# Patient Record
Sex: FEMALE | Race: WHITE | NOT HISPANIC OR LATINO | Employment: FULL TIME | URBAN - METROPOLITAN AREA
[De-identification: names, ages, dates, MRNs, and addresses within clinical notes are randomized per-mention and may not be internally consistent; named-entity substitution may affect disease eponyms.]

---

## 2019-04-18 ENCOUNTER — APPOINTMENT (OUTPATIENT)
Dept: RADIOLOGY | Facility: MEDICAL CENTER | Age: 69
End: 2019-04-18
Attending: EMERGENCY MEDICINE
Payer: OTHER MISCELLANEOUS

## 2019-04-18 ENCOUNTER — HOSPITAL ENCOUNTER (EMERGENCY)
Facility: MEDICAL CENTER | Age: 69
End: 2019-04-18
Attending: EMERGENCY MEDICINE
Payer: OTHER MISCELLANEOUS

## 2019-04-18 VITALS
HEIGHT: 63 IN | SYSTOLIC BLOOD PRESSURE: 133 MMHG | BODY MASS INDEX: 34.38 KG/M2 | OXYGEN SATURATION: 98 % | DIASTOLIC BLOOD PRESSURE: 75 MMHG | RESPIRATION RATE: 18 BRPM | TEMPERATURE: 98.5 F | WEIGHT: 194 LBS | HEART RATE: 78 BPM

## 2019-04-18 DIAGNOSIS — S29.019A STRAIN OF THORACIC REGION, INITIAL ENCOUNTER: ICD-10-CM
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
## 2019-04-18 DIAGNOSIS — S00.03XA HEMATOMA OF SCALP, INITIAL ENCOUNTER: ICD-10-CM

## 2019-04-18 DIAGNOSIS — S32.591A PUBIC RAMUS FRACTURE, RIGHT, CLOSED, INITIAL ENCOUNTER (HCC): ICD-10-CM

## 2019-04-18 DIAGNOSIS — S20.219A CONTUSION OF CHEST WALL, UNSPECIFIED LATERALITY, INITIAL ENCOUNTER: ICD-10-CM

## 2019-04-18 PROCEDURE — A9270 NON-COVERED ITEM OR SERVICE: HCPCS | Performed by: EMERGENCY MEDICINE

## 2019-04-18 PROCEDURE — 72128 CT CHEST SPINE W/O DYE: CPT

## 2019-04-18 PROCEDURE — 700102 HCHG RX REV CODE 250 W/ 637 OVERRIDE(OP): Performed by: EMERGENCY MEDICINE

## 2019-04-18 PROCEDURE — 71045 X-RAY EXAM CHEST 1 VIEW: CPT

## 2019-04-18 PROCEDURE — 72125 CT NECK SPINE W/O DYE: CPT

## 2019-04-18 PROCEDURE — 70450 CT HEAD/BRAIN W/O DYE: CPT

## 2019-04-18 PROCEDURE — 700111 HCHG RX REV CODE 636 W/ 250 OVERRIDE (IP): Performed by: EMERGENCY MEDICINE

## 2019-04-18 PROCEDURE — 99285 EMERGENCY DEPT VISIT HI MDM: CPT

## 2019-04-18 PROCEDURE — 72192 CT PELVIS W/O DYE: CPT

## 2019-04-18 PROCEDURE — 96374 THER/PROPH/DIAG INJ IV PUSH: CPT

## 2019-04-18 PROCEDURE — 73502 X-RAY EXAM HIP UNI 2-3 VIEWS: CPT | Mod: RT

## 2019-04-18 RX ORDER — IBUPROFEN 600 MG/1
600 TABLET ORAL ONCE
Status: COMPLETED | OUTPATIENT
Start: 2019-04-18 | End: 2019-04-18

## 2019-04-18 RX ORDER — METOPROLOL TARTRATE 50 MG/1
50 TABLET, FILM COATED ORAL 2 TIMES DAILY
COMMUNITY

## 2019-04-18 RX ORDER — HYDROCHLOROTHIAZIDE 25 MG/1
25 TABLET ORAL DAILY
COMMUNITY

## 2019-04-18 RX ORDER — KETOROLAC TROMETHAMINE 30 MG/ML
15 INJECTION, SOLUTION INTRAMUSCULAR; INTRAVENOUS ONCE
Status: COMPLETED | OUTPATIENT
Start: 2019-04-18 | End: 2019-04-18

## 2019-04-18 RX ORDER — METHOCARBAMOL 750 MG/1
750 TABLET, FILM COATED ORAL 4 TIMES DAILY
Qty: 12 TAB | Refills: 0 | Status: SHIPPED | OUTPATIENT
Start: 2019-04-18 | End: 2019-04-21

## 2019-04-18 RX ORDER — HYDROCODONE BITARTRATE AND ACETAMINOPHEN 5; 325 MG/1; MG/1
1 TABLET ORAL ONCE
Status: COMPLETED | OUTPATIENT
Start: 2019-04-18 | End: 2019-04-18

## 2019-04-18 RX ORDER — LISINOPRIL 20 MG/1
20 TABLET ORAL DAILY
COMMUNITY

## 2019-04-18 RX ORDER — LEVOTHYROXINE SODIUM 112 UG/1
112 TABLET ORAL
COMMUNITY

## 2019-04-18 RX ORDER — KETOROLAC TROMETHAMINE 30 MG/ML
15 INJECTION, SOLUTION INTRAMUSCULAR; INTRAVENOUS ONCE
Status: DISCONTINUED | OUTPATIENT
Start: 2019-04-18 | End: 2019-04-18

## 2019-04-18 RX ADMIN — KETOROLAC TROMETHAMINE 15 MG: 30 INJECTION, SOLUTION INTRAMUSCULAR at 19:00

## 2019-04-18 RX ADMIN — HYDROCODONE BITARTRATE AND ACETAMINOPHEN 1 TABLET: 5; 325 TABLET ORAL at 20:24

## 2019-04-18 RX ADMIN — IBUPROFEN 600 MG: 600 TABLET ORAL at 17:02

## 2019-04-18 NOTE — ED TRIAGE NOTES
"Chief Complaint   Patient presents with   • Head Injury     hit head on bumper, swelling to top of scalp.    • Chest Injury     Pt hit in the chest by a semi truck barone and fell back into another semi striking her head on the bumper.   • Hip Injury     R hip pain after fall, pt is able to ambulate     /86   Pulse 84   Temp 36.7 °C (98.1 °F) (Temporal)   Resp 20   Ht 1.6 m (5' 3\")   Wt 88 kg (194 lb 0.1 oz)   SpO2 92%   BMI 34.37 kg/m²     "

## 2019-04-18 NOTE — LETTER
"  FORM C-4:  EMPLOYEE’S CLAIM FOR COMPENSATION/ REPORT OF INITIAL TREATMENT  EMPLOYEE’S CLAIM - PROVIDE ALL INFORMATION REQUESTED   First Name  Delaney Last Name  Riley Birthdate             Age  1950 68 y.o. Sex  female Claim Number   Home Employee Address  [address] [place]                                     Zip  [address] Height  1.6 m (5' 3\") Weight  88 kg (194 lb 0.1 oz) [facility]     Mailing Employee Address                           [address] [place]               Zip  [address] Telephone  [phone] (home)  Primary Language Spoken  ENGLISH   Insurer Third Party    Employee's Occupation (Job Title) When Injury or Occupational Disease Occurred     Employer's Name   [employer] Telephone  [phone]    Employer Address  [address] [address]] Zip  [address]   Date of Injury        4/18/19 Hour of Injury  13:15 Date Employer Notified  4/18/19 Last Day of Work after Injury or Occupational Disease  4/18/19 Supervisor to Whom Injury Reported  Az Sandhu   Address or Location of Accident (if applicable)  [[employer]  [facility]]   What were you doing at the time of accident? (if applicable)   Lifting barone of turck to check oil   How did this injury or occupational disease occur? Be specific and answer in detail. Use additional sheet if necessary)  Lifted barone on trk wasn't secured properly came open too far hit me knocked me to ground, hit my head on ice bumper on truck parked in front of me   If you believe that you have an occupational disease, when did you first have knowledge of the disability and it relationship to your employment?  n/a Witnesses to the Accident    n/a   Nature of Injury or Occupational Disease   n/a Part(s) of Body Injured or Affected  , , head, back chest & pelvis fracture   I certify that the above is true and correct to the best of my knowledge and that I " have provided this information in order to obtain the benefits of [place] Chapter 617 [place]  I hereby authorize any physician, chiropractor, surgeon, practitioner, or other person, any hospital, including [facility] or [facility] hospital, any medical service organization, any insurance company, or other institution or organization to release to each other, any medical or other information, including benefits paid or payable, pertinent to this injury or disease, except information relative to diagnosis, treatment and/or counseling for AIDS, psychological conditions, alcohol or controlled substances, for which I must give specific authorization.  A Photostat of this authorization shall be as valid as the original.   Date  April 18, 2019 Place  [facility] Employee’s Signature   THIS REPORT MUST BE COMPLETED AND MAILED WITHIN 3 WORKING DAYS OF TREATMENT   Place  [facility], EMERGENCY DEPT  Name of Facility   [facility]   Date  4/18/2019 Diagnosis  (W19.XXXA) Fall, initial encounter, Acute  (S00.03XA) Hematoma of scalp, initial encounter, Acute  (S32.591A) Pubic ramus fracture, right, closed, initial encounter (HCC), Acute  (S20.219A) Contusion of chest wall, unspecified laterality, initial encounter, Acute  (S29.019A) Strain of thoracic region, initial encounter, Acute Is there evidence the injured employee was under the influence of alcohol and/or another controlled substance at the time of accident?   Hour  9:45 PM Description of Injury or Disease  Fall, initial encounter  Hematoma of scalp, initial encounter  Pubic ramus fracture, right, closed, initial encounter (HCC)  Contusion of chest wall, unspecified laterality, initial encounter  Strain of thoracic region, initial encounter No   Treatment  Exam/eval--patient found to have chest wall contusion,  "scalp hematoma, and a pubic rami fracture.  Have you advised the patient to remain off work five days or more?         No   X-Ray Findings  Positive   If Yes   From Date    To Date      From information given by the employee, together with medical evidence, can you directly connect this injury or occupational disease as job incurred?  Yes If No, is the employee capable of: Full Duty    Modified Duty  No   Is additional medical care by a physician indicated?  Yes If Modified Duty, Specify any Limitations / Restrictions  No sitting for long periods of time.  No heavy lifting greater than 10 pounds.     Do you know of any previous injury or disease contributing to this condition or occupational disease?  No   Date  4/18/2019 Print Doctor’s Name  Sylvia Tang certify the employer’s copy of this form was mailed on:   Address  [address] [address] [phone] Insurer’s Use Only   [facility]  [address]    Provider’s Tax ID Number  [id number] Telephone  Dept: [phone]    Doctor’s Signature  e-SYLVIA Reyna M.D. Degree  MD    Original - TREATING PHYSICIAN OR CHIROPRACTOR   Pg 2-Insurer/TPA   Pg 3-Employer   Pg 4-Employee                                                                                                  Form C-4 (rev01/03)     BRIEF DESCRIPTION OF RIGHTS AND BENEFITS  (Pursuant to [place]RS 616C.050)    Notice of Injury or Occupational Disease (Incident Report Form C-1): If an injury or occupational disease (OD) arises out of and in the course of employment, you must provide written notice to your employer as soon as practicable, but no later than 7 days after the accident or OD. Your employer shall maintain a sufficient supply of the required forms.    Claim for Compensation (Form C-4): If medical treatment is sought, the form C-4 is available at the place of initial treatment. A completed \"Claim for Compensation\" (Form C-4) must be filed within 90 days after an " accident or OD. The treating physician or chiropractor must, within 3 working days after treatment, complete and mail to the employer, the employer's insurer and third-party , the Claim for Compensation.    Medical Treatment: If you require medical treatment for your on-the-job injury or OD, you may be required to select a physician or chiropractor from a list provided by your workers’ compensation insurer, if it has contracted with an Organization for Managed Care (MCO) or Preferred Provider Organization (PPO) or providers of health care. If your employer has not entered into a contract with an MCO or PPO, you may select a physician or chiropractor from the Panel of Physicians and Chiropractors. Any medical costs related to your industrial injury or OD will be paid by your insurer.    Temporary Total Disability (TTD): If your doctor has certified that you are unable to work for a period of at least 5 consecutive days, or 5 cumulative days in a 20-day period, or places restrictions on you that your employer does not accommodate, you may be entitled to TTD compensation.    Temporary Partial Disability (TPD): If the wage you receive upon reemployment is less than the compensation for TTD to which you are entitled, the insurer may be required to pay you TPD compensation to make up the difference. TPD can only be paid for a maximum of 24 months.    Permanent Partial Disability (PPD): When your medical condition is stable and there is an indication of a PPD as a result of your injury or OD, within 30 days, your insurer must arrange for an evaluation by a rating physician or chiropractor to determine the degree of your PPD. The amount of your PPD award depends on the date of injury, the results of the PPD evaluation and your age and wage.    Permanent Total Disability (PTD): If you are medically certified by a treating physician or chiropractor as permanently and totally disabled and have been granted a PTD  status by your insurer, you are entitled to receive monthly benefits not to exceed 66 2/3% of your average monthly wage. The amount of your PTD payments is subject to reduction if you previously received a PPD award.    Vocational Rehabilitation Services: You may be eligible for vocational rehabilitation services if you are unable to return to the job due to a permanent physical impairment or permanent restrictions as a result of your injury or occupational disease.    Transportation and Per Duane Reimbursement: You may be eligible for travel expenses and per duane associated with medical treatment.  Reopening: You may be able to reopen your claim if your condition worsens after claim closure.    Appeal Process: If you disagree with a written determination issued by the insurer or the insurer does not respond to your request, you may appeal to the Department of Administration, , by following the instructions contained in your determination letter. You must appeal the determination within 70 days from the date of the determination letter at [address], [address], [place] [address], or [address], [address], [place] [address]. If you disagree with the  decision, you may appeal to the Department of Administration, . You must file your appeal within 30 days from the date of the  decision letter at [address], [address], [place], [place] [address], or [address], [address], [place] [address]. If you disagree with a decision of an , you may file a petition for judicial review with the District Court. You must do so within 30 days of the Appeal Officer’s decision. You may be represented by an  at your own expense or you may contact the [facility] for possible representation.    [place]  for Injured Workers (NAIW): If you disagree with a  decision, you may request  that NAIW represent you without charge at an  Hearing. For information regarding denial of benefits, you may contact the NA at: [address], [address], [place] [address], [phone], or [address], [place] [address], [phone]    To File a Complaint with the Division: If you wish to file a complaint with the  of the Division of Industrial Relations (DIR), please contact the Workers’ Compensation Section, [address], [address], [place], [place] [address], telephone (564) [phone], or [address], [address], [place], [place] [address], telephone [phone].    For assistance with Workers’ Compensation Issues: you may contact the Office of the Governor Consumer Health Assistance, [address] [place] [address], [address],  [address], Toll Free 1-608.451.2456, Web site: http://govcha.[facility].nv., E-mail [email or web address].                                                                                                                                                                               __________________________________________________________________                                    April 18, 2019            Employee Name / Signature                                                                                                                            Date                                       D-2 (rev. 10/07)

## 2019-04-18 NOTE — LETTER
"  FORM C-4:  EMPLOYEE’S CLAIM FOR COMPENSATION/ REPORT OF INITIAL TREATMENT  EMPLOYEE’S CLAIM - PROVIDE ALL INFORMATION REQUESTED   First Name  Delaney Last Name  Riley Birthdate             Age  1950 68 y.o. Sex  female Claim Number   Home Employee Address  [address] [place]                                     Zip  [address] Height  1.6 m (5' 3\") Weight  88 kg (194 lb 0.1 oz) N  [id number]   Mailing Employee Address                           [address] [place]               Zip  [address] Telephone  [phone] (home)  Primary Language Spoken  ENGLISH   Insurer  *** Third Party   N/A Employee's Occupation (Job Title) When Injury or Occupational Disease Occurred     Employer's Name   Telephone  [phone]    Employer Address  [address] [place] [address] [6] Zip  [address]   Date of Injury         Hour of Injury   Date Employer Notified   Last Day of Work after Injury or Occupational Disease   Supervisor to Whom Injury Reported     Address or Location of Accident (if applicable)  [[employer]  [place]]   What were you doing at the time of accident? (if applicable)      How did this injury or occupational disease occur? Be specific and answer in detail. Use additional sheet if necessary)     If you believe that you have an occupational disease, when did you first have knowledge of the disability and it relationship to your employment?   Witnesses to the Accident       Nature of Injury or Occupational Disease    Part(s) of Body Injured or Affected  , ,     I certify that the above is true and correct to the best of my knowledge and that I have provided this information in order to obtain the benefits of [place] ([place] 616A to 616D, inclusive or Chapter 617 of [place]RS).  I hereby authorize any physician, chiropractor, surgeon, practitioner, or other person, any " hospital, including [facility] or [facility], any medical service organization, any insurance company, or other institution or organization to release to each other, any medical or other information, including benefits paid or payable, pertinent to this injury or disease, except information relative to diagnosis, treatment and/or counseling for AIDS, psychological conditions, alcohol or controlled substances, for which I must give specific authorization.  A Photostat of this authorization shall be as valid as the original.   Date Place   Employee’s Signature   THIS REPORT MUST BE COMPLETED AND MAILED WITHIN 3 WORKING DAYS OF TREATMENT   Place  [facility], EMERGENCY DEPT  Name of Facility   [facility]   Date  4/18/2019 Diagnosis  (W19.XXXA) Fall, initial encounter, Acute  (S00.03XA) Hematoma of scalp, initial encounter, Acute  (S32.591A) Pubic ramus fracture, right, closed, initial encounter (HCC), Acute  (S20.219A) Contusion of chest wall, unspecified laterality, initial encounter, Acute  (S29.019A) Strain of thoracic region, initial encounter, Acute Is there evidence the injured employee was under the influence of alcohol and/or another controlled substance at the time of accident?   Hour  9:25 PM Description of Injury or Disease  Fall, initial encounter  Hematoma of scalp, initial encounter  Pubic ramus fracture, right, closed, initial encounter (HCC)  Contusion of chest wall, unspecified laterality, initial encounter  Strain of thoracic region, initial encounter     Treatment     Have you advised the patient to remain off work five days or more?             X-Ray Findings      If Yes   From Date    To Date      From information given by the employee, together with medical evidence, can you directly connect this injury or occupational disease as job incurred?    If No, is the employee capable of: Full Duty    Modified Duty      Is additional  "medical care by a physician indicated?    If Modified Duty, Specify any Limitations / Restrictions        Do you know of any previous injury or disease contributing to this condition or occupational disease?      Date  4/18/2019 Print Doctor’s Name  Kitty Annabelle OAKES ANDREW certify the employer’s copy of this form was mailed on:   Address  [address] [place] [address] [phone] Insurer’s Use Only   [facility]  [address]    Provider’s Tax ID Number  [id number] Telephone  Dept: [phone]    Doctor’s Signature    Degree       Original - TREATING PHYSICIAN OR CHIROPRACTOR   Pg 2-Insurer/TPA   Pg 3-Employer   Pg 4-Employee                                                                                                  Form C-4 (rev01/03)     BRIEF DESCRIPTION OF RIGHTS AND BENEFITS  ([place]ursuant to [place]RS 616C.050)    Notice of Injury or Occupational Disease (Incident Report Form C-1): If an injury or occupational disease (OD) arises out of and in the course of employment, you must provide written notice to your employer as soon as practicable, but no later than 7 days after the accident or OD. Your employer shall maintain a sufficient supply of the required forms.    Claim for Compensation (Form C-4): If medical treatment is sought, the form C-4 is available at the place of initial treatment. A completed \"Claim for Compensation\" (Form C-4) must be filed within 90 days after an accident or OD. The treating physician or chiropractor must, within 3 working days after treatment, complete and mail to the employer, the employer's insurer and third-party , the Claim for Compensation.    Medical Treatment: If you require medical treatment for your on-the-job injury or OD, you may be required to select a physician or chiropractor from a list provided by your workers’ compensation insurer, if it has contracted with an Organization for Managed Care (MCO) or Preferred Provider Organization (PPO) " or providers of health care. If your employer has not entered into a contract with an MCO or PPO, you may select a physician or chiropractor from the Panel of Physicians and Chiropractors. Any medical costs related to your industrial injury or OD will be paid by your insurer.    Temporary Total Disability (TTD): If your doctor has certified that you are unable to work for a period of at least 5 consecutive days, or 5 cumulative days in a 20-day period, or places restrictions on you that your employer does not accommodate, you may be entitled to TTD compensation.    Temporary Partial Disability (TPD): If the wage you receive upon reemployment is less than the compensation for TTD to which you are entitled, the insurer may be required to pay you TPD compensation to make up the difference. TPD can only be paid for a maximum of 24 months.    Permanent Partial Disability (PPD): When your medical condition is stable and there is an indication of a PPD as a result of your injury or OD, within 30 days, your insurer must arrange for an evaluation by a rating physician or chiropractor to determine the degree of your PPD. The amount of your PPD award depends on the date of injury, the results of the PPD evaluation and your age and wage.    Permanent Total Disability (PTD): If you are medically certified by a treating physician or chiropractor as permanently and totally disabled and have been granted a PTD status by your insurer, you are entitled to receive monthly benefits not to exceed 66 2/3% of your average monthly wage. The amount of your PTD payments is subject to reduction if you previously received a PPD award.    Vocational Rehabilitation Services: You may be eligible for vocational rehabilitation services if you are unable to return to the job due to a permanent physical impairment or permanent restrictions as a result of your injury or occupational disease.    Transportation and Per Sheela Reimbursement: You may be  eligible for travel expenses and per duane associated with medical treatment.  Reopening: You may be able to reopen your claim if your condition worsens after claim closure.    Appeal Process: If you disagree with a written determination issued by the insurer or the insurer does not respond to your request, you may appeal to the Department of Administration, , by following the instructions contained in your determination letter. You must appeal the determination within 70 days from the date of the determination letter at [address], [address], [place] [address], or [address], [place], [place] [address]. If you disagree with the  decision, you may appeal to the Department of Administration, . You must file your appeal within 30 days from the date of the  decision letter at [address], [address], [place], [place] [address], or [address], [address], [place], [place] [address]. If you disagree with a decision of an , you may file a petition for judicial review with the District Court. You must do so within 30 days of the Appeal Officer’s decision. You may be represented by an  at your own expense or you may contact the [facility] for possible representation.    [place]  for Injured Workers (NAIW): If you disagree with a  decision, you may request that NAIW represent you without charge at an  Hearing. For information regarding denial of benefits, you may contact the [facility] at: [address], [address] [address], [phone], or [address], [address], [place] [address], [phone]    To File a Complaint with the Division: If you wish to file a complaint with the  of the Division of Industrial Relations (DIR), please contact the Workers’ Compensation Section, [address], [address], [place],  [place] [address], telephone [phone]) [phone], or [address], [address], [place] [address], telephone [phone].    For assistance with Workers’ Compensation Issues: you may contact the Office of the Governor Consumer Health Assistance, [address] [place] [address], [address], [place], [place] [address], Toll Free 1-700.640.3095, Web site: http://[facility].nv., E-mail [email or web address].                                                                                                                                                                               __________________________________________________________________                                    _________________            Employee Name / Signature                                                                                                                            Date                                       D-2 (rev. 10/07)

## 2019-04-18 NOTE — LETTER
"  FORM C-4:  EMPLOYEE’S CLAIM FOR COMPENSATION/ REPORT OF INITIAL TREATMENT  EMPLOYEE’S CLAIM - PROVIDE ALL INFORMATION REQUESTED   First Name  Delaney Last Name  Riley Birthdate             Age  1950 68 y.o. Sex  female Claim Number   Home Employee Address    City    State                                      Zip   Height  1.6 m (5' 3\") Weight  88 kg (194 lb 0.1 oz) [facility]  [id number]   Mailing Employee Address                              [facility]   Telephone  There are no phone numbers on file. Primary Language Spoken  ENGLISH   Insurer  *** Third Party   N/A Employee's Occupation (Job Title) When Injury or Occupational Disease Occurred     Employer's Name   Telephone  [phone]    Employer Address  [address] [facility] [6] Zip  [address]   Date of Injury         Hour of Injury   Date Employer Notified   Last Day of Work after Injury or Occupational Disease   Supervisor to Whom Injury Reported     Address or Location of Accident (if applicable)     What were you doing at the time of accident? (if applicable)      How did this injury or occupational disease occur? Be specific and answer in detail. Use additional sheet if necessary)     If you believe that you have an occupational disease, when did you first have knowledge of the disability and it relationship to your employment?   Witnesses to the Accident       Nature of Injury or Occupational Disease    Part(s) of Body Injured or Affected  , ,     I certify that the above is true and correct to the best of my knowledge and that I have provided this information in order to obtain the benefits of [place]  I hereby authorize any physician, chiropractor, surgeon, practitioner, or other person, any hospital, including [facility] or [facility], any medical service " organization, any insurance company, or other institution or organization to release to each other, any medical or other information, including benefits paid or payable, pertinent to this injury or disease, except information relative to diagnosis, treatment and/or counseling for AIDS, psychological conditions, alcohol or controlled substances, for which I must give specific authorization.  A Photostat of this authorization shall be as valid as the original.   Date Place   Employee’s Signature   THIS REPORT MUST BE COMPLETED AND MAILED WITHIN 3 WORKING DAYS OF TREATMENT   Place  [facility], EMERGENCY DEPT  Name of Facility   [facility]   Date  4/18/2019 Diagnosis  (W19.XXXA) Fall, initial encounter, Acute  (S00.03XA) Hematoma of scalp, initial encounter, Acute  (S32.591A) Pubic ramus fracture, right, closed, initial encounter (HCC), Acute  (S20.219A) Contusion of chest wall, unspecified laterality, initial encounter, Acute  (S29.019A) Strain of thoracic region, initial encounter, Acute Is there evidence the injured employee was under the influence of alcohol and/or another controlled substance at the time of accident?   Hour  8:34 PM Description of Injury or Disease  Fall, initial encounter  Hematoma of scalp, initial encounter  Pubic ramus fracture, right, closed, initial encounter (HCC)  Contusion of chest wall, unspecified laterality, initial encounter  Strain of thoracic region, initial encounter     Treatment     Have you advised the patient to remain off work five days or more?             X-Ray Findings      If Yes   From Date    To Date      From information given by the employee, together with medical evidence, can you directly connect this injury or occupational disease as job incurred?    If No, is the employee capable of: Full Duty    Modified Duty      Is additional medical care by a physician indicated?    If Modified Duty, Specify any Limitations /  "Restrictions        Do you know of any previous injury or disease contributing to this condition or occupational disease?      Date  4/18/2019 Print Doctor’s Name  Freddyjj Annabelle R I certify the employer’s copy of this form was mailed on:   Address  [address] [id number] [phone] Insurer’s Use Only   [facility]  [address]    Provider’s Tax ID Number  [id number] Telephone  Dept: [phone]    Doctor’s Signature    Degree       Original - TREATING PHYSICIAN OR CHIROPRACTOR   Pg 2-Insurer/TPA   Pg 3-Employer   Pg 4-Employee                                                                                                  Form C-4 (rev01/03)     BRIEF DESCRIPTION OF RIGHTS AND BENEFITS  ([place]ursuant to [place])    Notice of Injury or Occupational Disease (Incident Report Form C-1): If an injury or occupational disease (OD) arises out of and in the course of employment, you must provide written notice to your employer as soon as practicable, but no later than 7 days after the accident or OD. Your employer shall maintain a sufficient supply of the required forms.    Claim for Compensation (Form C-4): If medical treatment is sought, the form C-4 is available at the place of initial treatment. A completed \"Claim for Compensation\" (Form C-4) must be filed within 90 days after an accident or OD. The treating physician or chiropractor must, within 3 working days after treatment, complete and mail to the employer, the employer's insurer and third-party , the Claim for Compensation.    Medical Treatment: If you require medical treatment for your on-the-job injury or OD, you may be required to select a physician or chiropractor from a list provided by your workers’ compensation insurer, if it has contracted with an Organization for Managed Care (MCO) or Preferred Provider Organization (PPO) or providers of health care. If your employer has not entered into a contract with an " MCO or PPO, you may select a physician or chiropractor from the Panel of Physicians and Chiropractors. Any medical costs related to your industrial injury or OD will be paid by your insurer.    Temporary Total Disability (TTD): If your doctor has certified that you are unable to work for a period of at least 5 consecutive days, or 5 cumulative days in a 20-day period, or places restrictions on you that your employer does not accommodate, you may be entitled to TTD compensation.    Temporary Partial Disability (TPD): If the wage you receive upon reemployment is less than the compensation for TTD to which you are entitled, the insurer may be required to pay you TPD compensation to make up the difference. TPD can only be paid for a maximum of 24 months.    Permanent Partial Disability (PPD): When your medical condition is stable and there is an indication of a PPD as a result of your injury or OD, within 30 days, your insurer must arrange for an evaluation by a rating physician or chiropractor to determine the degree of your PPD. The amount of your PPD award depends on the date of injury, the results of the PPD evaluation and your age and wage.    Permanent Total Disability (PTD): If you are medically certified by a treating physician or chiropractor as permanently and totally disabled and have been granted a PTD status by your insurer, you are entitled to receive monthly benefits not to exceed 66 2/3% of your average monthly wage. The amount of your PTD payments is subject to reduction if you previously received a PPD award.    Vocational Rehabilitation Services: You may be eligible for vocational rehabilitation services if you are unable to return to the job due to a permanent physical impairment or permanent restrictions as a result of your injury or occupational disease.    Transportation and Per [place] Reimbursement: You may be eligible for travel expenses and per duane associated with medical treatment.  Reopening:  You may be able to reopen your claim if your condition worsens after claim closure.    Appeal Process: If you disagree with a written determination issued by the insurer or the insurer does not respond to your request, you may appeal to the Department of Administration, , by following the instructions contained in your determination letter. You must appeal the determination within 70 days from the date of the determination letter at [address], [address], [place], [place] [address], or [address], [place], [place] [address]. If you disagree with the  decision, you may appeal to the Department of Administration, . You must file your appeal within 30 days from the date of the  decision letter at [address], [address], [place], [place] [address], or [address], [place], [place] [address]. If you disagree with a decision of an , you may file a petition for judicial review with the District Court. You must do so within 30 days of the Appeal Officer’s decision. You may be represented by an  at your own expense or you may contact the [facility] for possible representation.    [place]  for Injured Workers (NAIW): If you disagree with a  decision, you may request that NAIW represent you without charge at an  Hearing. For information regarding denial of benefits, you may contact the [facility] at: [address], [address], [place] [address], [phone], or [address], [place] [address], [phone]    To File a Complaint with the Division: If you wish to file a complaint with the  of the Division of Industrial Relations (DIR), please contact the Workers’ Compensation Section, [address], [address], [place], [place] [address], telephone ([phone]) [phone], or [address], [address],  [place], [place] [address], telephone [phone].    For assistance with Workers’ Compensation Issues: you may contact the Office of the Governor Consumer Health Assistance, [address] [place] [address], [address], [place], [place] [address], Toll Free 1-562.676.2173, Web site: http://[email or web address].[email or web address]., E-mail [email or web address]                                                                                                                                                                               __________________________________________________________________                                    _________________            Employee Name / Signature                                                                                                                            Date                                       D-2 (rev. 10/07)

## 2019-04-18 NOTE — ED PROVIDER NOTES
"ED Provider Note    CHIEF COMPLAINT  Chief Complaint   Patient presents with   • Head Injury     hit head on bumper, swelling to top of scalp.    • Chest Injury     Pt hit in the chest by a semi truck barone and fell back into another semi striking her head on the bumper.   • Hip Injury     R hip pain after fall, pt is able to ambulate       HPI  Delaney Lin is a 68 y.o. female who presents with complaint of anterior chest pain, mid back pain, a \"ostrich egg\" to her scalp, and some right groin pain with ambulation after she was struck in the chest by the barone of her semitruck and then pushed to the ground where she landed on her buttock and then fell back and struck her head on the bumper of the semitruck that was parked behind her.  She said she was trying to check the oil in her semitruck.  She pulled the barone forward to check that well.  As she tried to push the head back up into the locked position it fell backward, striking her in the chest and the pushing her to the ground.  No LOC.  She complains of a headache which is \"quite bad\" in the area of the hematoma.  No dizziness at this time although she reports some dizziness upon EMS arrival.  No vision changes.  No neck pain.  She complains of some mid back pain.  She states she does not have any hip pain unless she tries to walk.  When she walks she feels a little bit of pain in her groin.  She had some tingling in bilateral feet while in the ambulance but patient states that often happens when her knees are stretched out in extension for a while.  She states when she bent her knees up her feet tingling went away.  No abdominal pain.  No numbness or tingling in the upper extremities.  She is not on any blood thinning medications.    REVIEW OF SYSTEMS  See HPI for further details. All other systems are negative.    PAST MEDICAL HISTORY  Past Medical History:   Diagnosis Date   • Hypertension    • Hypothyroidism        FAMILY HISTORY  History reviewed. No " "pertinent family history.    SOCIAL HISTORY  Social History     Social History   • Marital status: N/A     Spouse name: N/A   • Number of children: N/A   • Years of education: N/A     Social History Main Topics   • Smoking status: Former Smoker   • Smokeless tobacco: Never Used   • Alcohol use Yes      Comment: rare   • Drug use: No   • Sexual activity: Not on file     Other Topics Concern   • Not on file     Social History Narrative   • No narrative on file       SURGICAL HISTORY  Past Surgical History:   Procedure Laterality Date   • OTHER      back surgery   • OTHER ORTHOPEDIC SURGERY      6 knee surgeries on each knee       CURRENT MEDICATIONS  Home Medications     Reviewed by Shakila Pack R.N. (Registered Nurse) on 04/18/19 at 1511  Med List Status: Complete   Medication Last Dose Status   hydroCHLOROthiazide (HYDRODIURIL) 25 MG Tab 4/17/2019 Active   levothyroxine (SYNTHROID) 112 MCG Tab 4/18/2019 Active   lisinopril (PRINIVIL) 20 MG Tab 4/17/2019 Active   metoprolol (LOPRESSOR) 50 MG Tab 4/17/2019 Active   multivitamin (THERAGRAN) Tab 4/17/2019 Active                ALLERGIES  Allergies   Allergen Reactions   • Bee    • Keflex      Redness and swelling   • Pcn [Penicillins] Vomiting     \"blacks out\"   • Wasp Venom        PHYSICAL EXAM  VITAL SIGNS: /86   Pulse 84   Temp 36.7 °C (98.1 °F) (Temporal)   Resp 20   Ht 1.6 m (5' 3\")   Wt 88 kg (194 lb 0.1 oz)   SpO2 92%   BMI 34.37 kg/m²    Constitutional: Well developed, well nourished; No acute distress; Non-toxic appearance.   HENT: Normocephalic, atraumatic; Bilateral external ears normal; Oropharynx with moist mucous membranes; No erythema or exudates in the posterior oropharynx.   Eyes: PERRL, EOMI, Conjunctiva normal. No discharge.   Neck:  Supple, nontender midline; No stridor; No nuchal rigidity.  Nontender midline C-spine.  Lymphatic: No cervical lymphadenopathy noted.   Cardiovascular: Regular rate and rhythm without murmurs, rubs, or " gallop.   Thorax & Lungs: No respiratory distress, breath sounds clear to auscultation bilaterally without wheezing, rales or rhonchi.  There is some tenderness to the anterior chest wall.  No crepitus or subcu air.  Abdomen: Soft, nontender, bowel sounds normal. No obvious masses; No pulsatile masses; no rebound, guarding, or peritoneal signs.   Skin: Good color; warm and dry without rash or petechia.  Back:  Tender mid thoracic spine  Extremities: Distal dorsalis pedis, posterior tibial pulses are equal bilaterally; No edema; Nontender calves or saphenous, No cyanosis, No clubbing.  There is no pain on the right hip with flexion, extension, internal extra rotation.  There is no tenderness to the hip or groin.  Finger and  Musculoskeletal: Good range of motion in all major joints. No tenderness to palpation or major deformities noted.   Neurologic: Alert & oriented x 4, clear speech; patient strengths are 5 out of 5 and equal testing of dorsiflexors and plantar flexors.      RADIOLOGY/PROCEDURES    Results   CT-PELVIS W/O (Order [id number])   4/18/2019  7:24 PM - Intf, Radiant In     Narrative       4/18/2019 6:53 PM    HISTORY/REASON FOR EXAM:  Pelvis trauma, fx suspected, initial exam; Pain/Deformity Following Trauma.      TECHNIQUE/EXAM DESCRIPTION AND NUMBER OF VIEWS:  CT scan of the pelvis without contrast.    Noncontrast helical sections were obtained from the iliac crests through the pubic symphysis.    Low dose optimization technique was utilized for this CT exam including automated exposure control and adjustment of the mA and/or kV according to patient size.    COMPARISON: X-ray from 4:34 PM    FINDINGS:  There is a fracture in the right inferior pubic ramus. No definite superior pubic ramus fracture is appreciated no sacral fracture is identified. No SI joint widening. There are small osteophytes in the SI joints with vacuum phenomenon.    There are postoperative and degenerative changes in the lower  lumbar spine.    There is osteophytic spurring from the acetabular with superior joint space narrowing of the hip joints.    Limited visualization of the visceral pelvis demonstrate scattered arterial calcifications. There are multiple diverticula. The uterus has been removed.   Impression       1.  Right inferior pubic ramus fracture. Fracture in the superior pubic ramus is not visualized.    2.  Atherosclerosis.    3.  Diverticulosis.     Results   CT-HEAD W/O (Order [id number])   4/18/2019  5:17 PM - Intf, Radiant In     Narrative       4/18/2019 4:21 PM    HISTORY/REASON FOR EXAM:  Headache, post traumatic; Head Injury.      TECHNIQUE/EXAM DESCRIPTION AND NUMBER OF VIEWS:  CT of the head without contrast.    Up to date radiation dose reduction adjustments have been utilized to meet ALARA standards for radiation dose reduction.    COMPARISON:  None available    FINDINGS:  No acute hemorrhage, mass-effect, or midline shift.   No intracranial mass or acute ischemia identified.   There is diffuse atrophy. Mild periventricular white matter changes consistent with chronic small vessel disease. Ventricles and cisterns are   patent.  The paranasal sinuses and mastoid air cells are clear.     Impression       No acute intracranial findings.     Results   DX-HIP-COMPLETE - UNILATERAL 2+ RIGHT (Order [id number])   4/18/2019  5:08 PM - Intf, Radiant In     Narrative       4/18/2019 4:36 PM    HISTORY/REASON FOR EXAM: Right groin pain. Ground-level fall.      TECHNIQUE/EXAM DESCRIPTION AND NUMBER OF VIEWS: 2 views of the RIGHT hip.    COMPARISON: None    FINDINGS:  Bone mineralization is osteopenic. There is no evidence of fracture or dislocation. There is degenerative and surgical change involving the lumbar spine. No evidence of arthropathy of the hip. There are pelvic calcifications.   Impression       No evidence of pelvic or proximal femoral fracture.       Results   CT-TSPINE W/O PLUS RECONS (Order [id number])   4/18/2019   5:06 PM - Intf, Radiant In     Narrative       4/18/2019 4:29 PM    HISTORY/REASON FOR EXAM:  T-spine fx, traumatic.  Fall.    TECHNIQUE/EXAM DESCRIPTION AND NUMBER OF VIEWS:  CT thoracic spine without contrast, with reconstructions.    The study was performed on a [employer]. CT scanner. Thin-section helical scanning was performed from C7-T1 through T12-L1. Sagittal and coronal multiplanar reconstructions were generated from the axial images.    Low dose optimization technique was utilized for this CT exam including automated exposure control and adjustment of the mA and/or kV according to patient size.    COMPARISON: CT cervical spine 4/18/2019    FINDINGS:  Upper thoracic spine primarily visualized on cervical spine CT.  Accentuated kyphosis of thoracic spine.  Multilevel loss of disc height and osteophyte formation.  Diffuse osteopenia.  Moderate loss of height anteriorly at the T6 level, chronic.  The facet joints show normal alignment.  Pedicle screws present at L4 consistent with prior lumbar fusion, incompletely visualized.  Visualized lung bases show large hiatal hernia and bilateral dependent atelectasis.   Impression       1.  Multilevel degenerative change with accentuated thoracic kyphosis.  2.  Chronic anterior wedge compression of T6.  3.  No acute thoracolumbar spine fracture or subluxation.  4.  Prior lumbar fusion.   Lab and Collection     Results   DX-CHEST-PORTABLE (1 VIEW) (Order [id number])   4/18/2019  5:06 PM - Intf, Radiant In     Narrative       4/18/2019 4:36 PM    HISTORY/REASON FOR EXAM:  Chest pain and shortness of breath    TECHNIQUE/EXAM DESCRIPTION AND NUMBER OF VIEWS:  Single portable view of the chest.    COMPARISON: None    FINDINGS:    There is interstitial prominence and linear bibasilar atelectasis.  There is no pleural effusion.  The heart is enlarged.     Impression       1.  Cardiomegaly with interstitial prominence.    2.  Bibasilar atelectasis.     Results   CT-CSPINE WITHOUT  PLUS RECONS (Order [id number])   4/18/2019  5:01 PM - Intf, Radiant In     Narrative       4/18/2019 4:29 PM    HISTORY/REASON FOR EXAM: C-spine trauma, low clinical risk (NEXUS/CCR); Pain Following Trauma  Head injury.  Fall.    TECHNIQUE/EXAM DESCRIPTION:  CT cervical spine without contrast, with reconstructions.    The study was performed on a helical multidetector CT scanner. Thin-section helical scanning was performed from the skull base through T1. Sagittal and coronal multiplanar reconstructions were generated from the axial images.    Low dose optimization technique was utilized for this CT exam including automated exposure control and adjustment of the mA and/or kV according to patient size.    COMPARISON:  None.    FINDINGS:  Diffuse osteopenia.  Alignment is preserved.  Vertebral body heights are preserved.  Intervertebral disc heights are normal.  Facets are normally aligned.  Multilevel facet hypertrophy present.  Axial images show no acute fracture.  Cervicothoracic junction is intact.  Prevertebral soft tissues are within normal limits.  Visualized lung apices are unremarkable.  Brachiocephalic artery appears ectatic measuring 2.5 cm in diameter.  Carotid atherosclerotic calcifications present.   Impression       1.  No cervical spine fracture or subluxation.  2.  Diffuse osteopenia.  3.  Multilevel facet hypertrophy.  4.  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         COURSE & MEDICAL DECISION MAKING  Pertinent Labs & Imaging studies reviewed. (See chart for details)  Patient presents to the ER with complaint of a hematoma to her scalp, mid back pain, and right hip pain.  She is a semitruck .  She states that she went to check the oil in her semitruck.  She flipped down the barone of her semitruck to check the oil.  She tried to put the food back up it fell down back onto her, striking her in the chest and then pushing her to the ground.  She states she fell backward,  landing on her right hip and buttock.  She then struck her head on the bumper of the semitruck that was parked in front of her.  No LOC.  She complains of headache in the area of the goose egg.  No nausea or vomiting.  She is on any blood thinners.  CT brain is negative.  CT C-spine is negative for any acute fracture.  Patient complains of chest discomfort in anterior chest which is in the region where the barone of her semitruck struck her.  Chest x-ray shows cardiomegaly with some interstitial markings, possibly scarring.  There is no obvious rib fracture.  No pneumothorax.  My partner, Dr. Federico Dang, who is certified in fellowship trained in ultrasound, performed a bedside ultrasound.  There is no pericardial effusion seen.  At this time I think the patient's cardiomegaly is chronic and is not related to her trauma.  She is not hypoxic.  She is not in any respiratory distress.  She is not tachycardic.  There is no signs of tamponade on.  More importantly there is no pericardial effusion seen on bedside ultrasound.  Patient was advised that she needs to follow-up with her primary care physician regarding plan the heart enlargement and the other incidental findings seen on her imaging studies today.  Patient initially complained of some right hip pain.  She is ambulatory.  Plain x-ray was negative.  When she walked to the bathroom she continued to have right hip pain.  For this reason CT of the pelvis was obtained.  CT pelvis reveals a inferior pubic ramus fracture.  No other hip fractures.  She was given a walker to help take the pressure off of her pelvis and she is to follow-up with work comp and orthopedics soon as she gets back home to [place].  With respect to her mid back pain, she has had trauma to her mid back before.  She has a old compression fracture at C6.  There is nothing new.  She has no tenderness to the lower lumbar spine no complaint of low back pain.  At this time I think she has a contusion  of her mid back and possibly a thoracic sprain.  It is possible she exacerbated an underlying injury as well.  Patient is hemodynamically stable.  She is not in any distress.  She is anxious to get back to her truck where she has 2 dogs.  Work comp form was filled out by myself.  She understands importance of follow-up with work comp.  She is also to follow-up with her primary care physician when she gets home to [place].  Patient has been given strict return precautions and discharge instructions.  She understands if she gets worse in any way she must return to the ER nightly.    FINAL IMPRESSION  1.   1. Fall, initial encounter Acute   2. Hematoma of scalp, initial encounter Acute   3. Pubic ramus fracture, right, closed, initial encounter ([facility]) Acute   4. Contusion of chest wall, unspecified laterality, initial encounter Acute   5. Strain of thoracic region, initial encounter Acute            Electronically signed by: Annabelle Tang, 4/18/2019 4:06 PM    9:42 PM April 25, 2019:    I contacted the patient earlier today to find out how she was doing.  I was able to get a hold of her on her cell phone.  She was in [place] on her way back to [place].  She was hoping to be in [place] by tomorrow.  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  I told her she would need to see a vascular surgeon as soon as she got home as this would need a vascular surgery evaluation as an outpatient to determine what they wanted to do about this.  It is a rather rare finding but definitely needs follow-up.  She told me that she was going to be seeing her own physician at the [facility] in [facility] [place] this coming Wednesday, May 1.  She says she will let her doctor know about this brachiocephalic artery aneurysm when she sees him on May 1.  She asked if we can get medical records sent to her.  I  spoke with the unit clerk who says the patient will need to sign a release form at the [facility] and then they can fax it over to [facility] and we will be able to get them everything they need in terms of x-ray reports etc.  I will contact the patient early tomorrow morning to let her know that she will need to go to the clinic, sign a release, and then go from there.  Patient assures me she will follow-up for this finding as described above.

## 2019-04-19 NOTE — DISCHARGE INSTRUCTIONS
Return to the emergency department for any worsening back pain, worsening chest pain, shortness of breath, coughing of phlegm or blood, headache, dizziness, nausea, vomiting, increased pain in your pelvis, numbness into your leg or foot, pain radiating down your legs or arms, fevers, chills, or for any concerns.    Be sure you follow-up with an orthopedist as soon as you get home so that you could be followed for your right inferior pubic ramus fracture.  Also follow-up with your primary care physician when you get home to discuss your large heart which was seen on the chest x-ray here today.

## 2019-04-19 NOTE — ED NOTES
D/C instructions, workman's comp paperwork and prescription given to pt, pt verbalizes understanding.